# Patient Record
(demographics unavailable — no encounter records)

---

## 2025-06-08 NOTE — HISTORY OF PRESENT ILLNESS
[FreeTextEntry1] : 51 yo M presents with several year history of ED. Sometimes able to attain erection but will last 5 seconds and lose erection. Denies any pain, hematospermia, history of STI, significant LUTS.   8/27/20 Interval history: Hasn't been seen since June 2018 S/p spinal fusion, otherwise no other changes in health Was initially satisfied with sildenafil 20mg but has had to gradually increase up to 100mg However, lately hasn't been able to attain erection adequate for penetration  Will get a slight erection that lasts for just a few seconds  10/4/21 Interval history: tried tadalafil 10mg and worked initially Efficacy seems to have decreased  3/6/23 Interval history: Increased to tadalafil 20mg at last visit Working well initially but now not as efficacious No other changes in health   2/1/24 Interval history: Continues to not have satisfactory erections with tadalafil Erections adequate for penetration but not as rigid as it used to be No issues with libido Most recent hgb a1c 9.7 PCP also changed meds recently due to cholesterol issues. Denies any urinary issues  5/29/25 Interval history: Doing well since last visit No changes in health, no urinary issues taking sildenafil 100mg and has been working well hgb a1c 7.2

## 2025-06-08 NOTE — ASSESSMENT
[FreeTextEntry1] : 58 yo M with ED  - Reviewed options for ED. OK to continue sildenafil. Rx renewed - PSA screening.    Patient is being seen today for evaluation and management of a chronic and longitudinal ongoing condition and I am of the primary treating physician.

## 2025-06-08 NOTE — ASSESSMENT
[FreeTextEntry1] : 60 yo M with ED  - Reviewed options for ED. OK to continue sildenafil. Rx renewed - PSA screening.    Patient is being seen today for evaluation and management of a chronic and longitudinal ongoing condition and I am of the primary treating physician.